# Patient Record
Sex: MALE | Race: WHITE | Employment: FULL TIME | URBAN - METROPOLITAN AREA
[De-identification: names, ages, dates, MRNs, and addresses within clinical notes are randomized per-mention and may not be internally consistent; named-entity substitution may affect disease eponyms.]

---

## 2018-01-29 ENCOUNTER — OFFICE VISIT (OUTPATIENT)
Dept: FAMILY MEDICINE CLINIC | Age: 48
End: 2018-01-29
Payer: COMMERCIAL

## 2018-01-29 VITALS
SYSTOLIC BLOOD PRESSURE: 122 MMHG | OXYGEN SATURATION: 96 % | HEART RATE: 57 BPM | TEMPERATURE: 98.2 F | DIASTOLIC BLOOD PRESSURE: 84 MMHG

## 2018-01-29 DIAGNOSIS — J40 BRONCHITIS: Primary | ICD-10-CM

## 2018-01-29 PROCEDURE — 99214 OFFICE O/P EST MOD 30 MIN: CPT | Performed by: NURSE PRACTITIONER

## 2018-01-29 RX ORDER — METHYLPREDNISOLONE 4 MG/1
TABLET ORAL
Qty: 1 KIT | Refills: 0 | Status: SHIPPED | OUTPATIENT
Start: 2018-01-29 | End: 2018-02-04

## 2018-01-29 RX ORDER — BENZONATATE 100 MG/1
100 CAPSULE ORAL 3 TIMES DAILY PRN
Qty: 20 CAPSULE | Refills: 0 | Status: SHIPPED | OUTPATIENT
Start: 2018-01-29 | End: 2018-02-05

## 2018-01-29 ASSESSMENT — ENCOUNTER SYMPTOMS
SHORTNESS OF BREATH: 0
EYE REDNESS: 0
EYE DISCHARGE: 0
SORE THROAT: 1
WHEEZING: 0
COUGH: 1
SINUS PRESSURE: 0
VOICE CHANGE: 0
CHEST TIGHTNESS: 0

## 2018-01-29 NOTE — PATIENT INSTRUCTIONS
Patient Education        Bronchitis: Care Instructions  Your Care Instructions    Bronchitis is inflammation of the bronchial tubes, which carry air to the lungs. The tubes swell and produce mucus, or phlegm. The mucus and inflamed bronchial tubes make you cough. You may have trouble breathing. Most cases of bronchitis are caused by viruses like those that cause colds. Antibiotics usually do not help and they may be harmful. Bronchitis usually develops rapidly and lasts about 2 to 3 weeks in otherwise healthy people. Follow-up care is a key part of your treatment and safety. Be sure to make and go to all appointments, and call your doctor if you are having problems. It's also a good idea to know your test results and keep a list of the medicines you take. How can you care for yourself at home? · Take all medicines exactly as prescribed. Call your doctor if you think you are having a problem with your medicine. · Get some extra rest.  · Take an over-the-counter pain medicine, such as acetaminophen (Tylenol), ibuprofen (Advil, Motrin), or naproxen (Aleve) to reduce fever and relieve body aches. Read and follow all instructions on the label. · Do not take two or more pain medicines at the same time unless the doctor told you to. Many pain medicines have acetaminophen, which is Tylenol. Too much acetaminophen (Tylenol) can be harmful. · Take an over-the-counter cough medicine that contains dextromethorphan to help quiet a dry, hacking cough so that you can sleep. Avoid cough medicines that have more than one active ingredient. Read and follow all instructions on the label. · Breathe moist air from a humidifier, hot shower, or sink filled with hot water. The heat and moisture will thin mucus so you can cough it out. · Do not smoke. Smoking can make bronchitis worse. If you need help quitting, talk to your doctor about stop-smoking programs and medicines.  These can increase your chances of quitting for good.  When should you call for help? Call 911 anytime you think you may need emergency care. For example, call if:  ? · You have severe trouble breathing. ?Call your doctor now or seek immediate medical care if:  ? · You have new or worse trouble breathing. ? · You cough up dark brown or bloody mucus (sputum). ? · You have a new or higher fever. ? · You have a new rash. ? Watch closely for changes in your health, and be sure to contact your doctor if:  ? · You cough more deeply or more often, especially if you notice more mucus or a change in the color of your mucus. ? · You are not getting better as expected. Where can you learn more? Go to [email or web address]. org and sign in to your [employer] account. Enter H333 in the [employer] box to learn more about \"Bronchitis: Care Instructions. \"     If you do not have an account, please click on the \"Sign Up Now\" link. Current as of: May 12, 2017  Content Version: 11.5  © 4235-9223 Healthwise, Incorporated. Care instructions adapted under license by [facility] ([facility]). If you have questions about a medical condition or this instruction, always ask your healthcare professional. [address] any warranty or liability for your use of this information.

## 2018-12-12 ENCOUNTER — OFFICE VISIT (OUTPATIENT)
Dept: ORTHOPEDIC SURGERY | Age: 48
End: 2018-12-12
Payer: COMMERCIAL

## 2018-12-12 VITALS
SYSTOLIC BLOOD PRESSURE: 122 MMHG | WEIGHT: 225 LBS | HEIGHT: 72 IN | DIASTOLIC BLOOD PRESSURE: 82 MMHG | HEART RATE: 48 BPM | BODY MASS INDEX: 30.48 KG/M2

## 2018-12-12 DIAGNOSIS — M70.42 PREPATELLAR BURSITIS OF LEFT KNEE: Primary | ICD-10-CM

## 2018-12-12 PROCEDURE — 99213 OFFICE O/P EST LOW 20 MIN: CPT | Performed by: PHYSICIAN ASSISTANT

## 2018-12-12 ASSESSMENT — ENCOUNTER SYMPTOMS
RESPIRATORY NEGATIVE: 1
SHORTNESS OF BREATH: 0
ABDOMINAL PAIN: 0
DIARRHEA: 0
APNEA: 0
CONSTIPATION: 0
VOMITING: 0
NAUSEA: 0
CHEST TIGHTNESS: 0
ABDOMINAL DISTENTION: 0
COLOR CHANGE: 0
COUGH: 0

## 2019-08-01 ENCOUNTER — OFFICE VISIT (OUTPATIENT)
Dept: ORTHOPEDIC SURGERY | Age: 49
End: 2019-08-01
Payer: COMMERCIAL

## 2019-08-01 VITALS
HEIGHT: 72 IN | WEIGHT: 242.8 LBS | DIASTOLIC BLOOD PRESSURE: 83 MMHG | BODY MASS INDEX: 32.89 KG/M2 | SYSTOLIC BLOOD PRESSURE: 130 MMHG | HEART RATE: 43 BPM

## 2019-08-01 DIAGNOSIS — M25.462 KNEE EFFUSION, LEFT: ICD-10-CM

## 2019-08-01 DIAGNOSIS — M67.52 PLICA OF KNEE, LEFT: ICD-10-CM

## 2019-08-01 DIAGNOSIS — M17.12 PRIMARY OSTEOARTHRITIS OF LEFT KNEE: Primary | ICD-10-CM

## 2019-08-01 PROCEDURE — 99213 OFFICE O/P EST LOW 20 MIN: CPT | Performed by: PHYSICIAN ASSISTANT

## 2019-08-01 PROCEDURE — 20611 DRAIN/INJ JOINT/BURSA W/US: CPT | Performed by: PHYSICIAN ASSISTANT

## 2019-08-01 ASSESSMENT — ENCOUNTER SYMPTOMS
ABDOMINAL DISTENTION: 0
ABDOMINAL PAIN: 0
CHEST TIGHTNESS: 0
VOMITING: 0
COUGH: 0
NAUSEA: 0
APNEA: 0
SHORTNESS OF BREATH: 0
CONSTIPATION: 0
DIARRHEA: 0
COLOR CHANGE: 0

## 2019-08-01 NOTE — PROGRESS NOTES
supervised procedures and developed the treatment plan.     Electronically signed by Peterson Osorio PA-C, on 8/2/2019 at 12:24 PM      Electronically signed by Peterson Osorio PA-C, on 8/2/2019 at 12:24 PM

## 2019-08-02 RX ORDER — LIDOCAINE HYDROCHLORIDE 10 MG/ML
4 INJECTION, SOLUTION INFILTRATION; PERINEURAL ONCE
Status: COMPLETED | OUTPATIENT
Start: 2019-08-02 | End: 2019-08-02

## 2019-08-02 RX ORDER — METHYLPREDNISOLONE ACETATE 40 MG/ML
40 INJECTION, SUSPENSION INTRA-ARTICULAR; INTRALESIONAL; INTRAMUSCULAR; SOFT TISSUE ONCE
Status: COMPLETED | OUTPATIENT
Start: 2019-08-02 | End: 2019-08-02

## 2019-08-02 RX ADMIN — METHYLPREDNISOLONE ACETATE 40 MG: 40 INJECTION, SUSPENSION INTRA-ARTICULAR; INTRALESIONAL; INTRAMUSCULAR; SOFT TISSUE at 12:46

## 2019-08-02 RX ADMIN — LIDOCAINE HYDROCHLORIDE 4 ML: 10 INJECTION, SOLUTION INFILTRATION; PERINEURAL at 12:46

## 2019-08-02 RX ADMIN — LIDOCAINE HYDROCHLORIDE 4 ML: 10 INJECTION, SOLUTION INFILTRATION; PERINEURAL at 12:45

## 2019-10-17 ENCOUNTER — OFFICE VISIT (OUTPATIENT)
Dept: FAMILY MEDICINE CLINIC | Age: 49
End: 2019-10-17
Payer: COMMERCIAL

## 2019-10-17 VITALS
WEIGHT: 239 LBS | SYSTOLIC BLOOD PRESSURE: 132 MMHG | OXYGEN SATURATION: 96 % | BODY MASS INDEX: 32.41 KG/M2 | TEMPERATURE: 97.9 F | DIASTOLIC BLOOD PRESSURE: 84 MMHG | RESPIRATION RATE: 18 BRPM | HEART RATE: 57 BPM
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
## 2019-10-17 PROCEDURE — 99213 OFFICE O/P EST LOW 20 MIN: CPT | Performed by: NURSE PRACTITIONER

## 2019-10-17 RX ORDER — TRIAMCINOLONE ACETONIDE 1 MG/G
CREAM TOPICAL
Qty: 30 G | Refills: 0 | OUTPATIENT
Start: 2019-10-17 | End: 2020-11-30

## 2019-10-17 ASSESSMENT — ENCOUNTER SYMPTOMS
NAUSEA: 0
SHORTNESS OF BREATH: 0
VOMITING: 0

## 2020-02-20 ENCOUNTER — OFFICE VISIT (OUTPATIENT)
Dept: FAMILY MEDICINE CLINIC | Age: 50
End: 2020-02-20
Payer: COMMERCIAL

## 2020-02-20 VITALS
SYSTOLIC BLOOD PRESSURE: 127 MMHG | HEIGHT: 72 IN | DIASTOLIC BLOOD PRESSURE: 83 MMHG | OXYGEN SATURATION: 98 % | HEART RATE: 49 BPM | WEIGHT: 235 LBS | RESPIRATION RATE: 16 BRPM | BODY MASS INDEX: 31.83 KG/M2 | TEMPERATURE: 98.6 F

## 2020-02-20 PROBLEM — R13.10 DYSPHAGIA: Status: ACTIVE | Noted: 2020-02-20

## 2020-02-20 PROBLEM — K22.70 BARRETT'S ESOPHAGUS: Status: ACTIVE | Noted: 2020-02-20

## 2020-02-20 PROBLEM — E66.9 OBESITY: Status: ACTIVE | Noted: 2020-02-20

## 2020-02-20 PROBLEM — E78.2 MIXED HYPERLIPIDEMIA: Status: ACTIVE | Noted: 2020-02-20

## 2020-02-20 PROBLEM — Z86.010 HISTORY OF COLONIC POLYPS: Status: ACTIVE | Noted: 2020-02-20

## 2020-02-20 PROBLEM — M67.52 PLICA SYNDROME OF LEFT KNEE: Status: ACTIVE | Noted: 2020-02-20

## 2020-02-20 PROCEDURE — 99213 OFFICE O/P EST LOW 20 MIN: CPT | Performed by: NURSE PRACTITIONER

## 2020-02-20 RX ORDER — AZITHROMYCIN 250 MG/1
TABLET, FILM COATED ORAL
Qty: 1 PACKET | Refills: 0 | Status: SHIPPED | OUTPATIENT
Start: 2020-02-20 | End: 2020-03-01

## 2020-02-20 RX ORDER — BENZONATATE 100 MG/1
100 CAPSULE ORAL 3 TIMES DAILY PRN
Qty: 21 CAPSULE | Refills: 0 | Status: SHIPPED | OUTPATIENT
Start: 2020-02-20 | End: 2020-02-27

## 2020-02-20 RX ORDER — METHYLPREDNISOLONE 4 MG/1
TABLET ORAL
Qty: 1 KIT | Refills: 0 | Status: SHIPPED | OUTPATIENT
Start: 2020-02-20 | End: 2020-02-26

## 2020-02-20 ASSESSMENT — ENCOUNTER SYMPTOMS
VOMITING: 0
WHEEZING: 1
ABDOMINAL PAIN: 0
SINUS PRESSURE: 1
NAUSEA: 0
TROUBLE SWALLOWING: 0
SORE THROAT: 1
COUGH: 1
RHINORRHEA: 0
EYE PAIN: 0
CHEST TIGHTNESS: 0
SHORTNESS OF BREATH: 1

## 2020-02-20 NOTE — PROGRESS NOTES
Extraocular Movements: Extraocular movements intact. Conjunctiva/sclera: Conjunctivae normal.   Neck:      Musculoskeletal: Normal range of motion and neck supple. Cardiovascular:      Rate and Rhythm: Regular rhythm. Bradycardia present. Pulses: Normal pulses. Pulmonary:      Effort: Pulmonary effort is normal. No tachypnea. Breath sounds: Examination of the right-lower field reveals decreased breath sounds. Examination of the left-lower field reveals decreased breath sounds. Decreased breath sounds, wheezing and rhonchi present. Abdominal:      General: Bowel sounds are normal.      Palpations: Abdomen is soft. Musculoskeletal: Normal range of motion. Right lower leg: No edema. Left lower leg: No edema. Skin:     General: Skin is warm and dry. Capillary Refill: Capillary refill takes less than 2 seconds. Neurological:      Mental Status: He is alert and oriented to person, place, and time. Motor: No weakness. Coordination: Coordination normal.   Psychiatric:         Mood and Affect: Mood normal.         Thought Content: Thought content normal.           MEDICAL DECISION MAKING Assessment/Plan:     Cammy Primrose was seen today for drainage, cough and head congestion. Diagnoses and all orders for this visit:    Acute bronchitis, unspecified organism  -     azithromycin (ZITHROMAX) 250 MG tablet; Take 2 tablets (500 mg) on Day 1, followed by 1 tablet (250 mg) once daily on Days 2 through 5.  -     methylPREDNISolone (MEDROL DOSEPACK) 4 MG tablet; Take by mouth. -     benzonatate (TESSALON PERLES) 100 MG capsule; Take 1 capsule by mouth 3 times daily as needed for Cough    Cough  -     benzonatate (TESSALON PERLES) 100 MG capsule; Take 1 capsule by mouth 3 times daily as needed for Cough        No results found for this or any previous visit. Based on the patient's history and exam will treat as bronchitis.    The patient does not have clinical findings suggestive of

## 2020-02-20 NOTE — LETTER
[address] [address]  Phone: [phone]  Fax: [phone]    BLACK Gorman CNP        February 20, 2020     Patient: Isabell Greenberg   YOB: 1970   Date of Visit: 2/20/2020       To Whom it May Concern:    Isabell Greenberg was seen in my clinic on 2/20/2020. He may return to work on 02/21/2020. .    If you have any questions or concerns, please don't hesitate to call.     Sincerely,         BLACK Gorman CNP

## 2020-11-30 ENCOUNTER — OFFICE VISIT (OUTPATIENT)
Dept: FAMILY MEDICINE CLINIC | Age: 50
End: 2020-11-30
Payer: COMMERCIAL

## 2020-11-30 ENCOUNTER — HOSPITAL ENCOUNTER (OUTPATIENT)
Age: 50
Setting detail: SPECIMEN
Discharge: HOME | End: 2020-11-30
Payer: COMMERCIAL

## 2020-11-30 VITALS
OXYGEN SATURATION: 98 % | BODY MASS INDEX: 33.32 KG/M2 | TEMPERATURE: 96.8 F | WEIGHT: 238 LBS | HEIGHT: 71 IN | HEART RATE: 56 BPM

## 2020-11-30 LAB — S PYO AG THROAT QL: NORMAL

## 2020-11-30 PROCEDURE — 87880 STREP A ASSAY W/OPTIC: CPT | Performed by: NURSE PRACTITIONER

## 2020-11-30 PROCEDURE — 99212 OFFICE O/P EST SF 10 MIN: CPT | Performed by: NURSE PRACTITIONER

## 2020-11-30 ASSESSMENT — ENCOUNTER SYMPTOMS
SINUS PAIN: 0
NAUSEA: 0
ABDOMINAL PAIN: 0
SORE THROAT: 0
EYE PAIN: 0
COUGH: 1
RHINORRHEA: 1
VOMITING: 0
BACK PAIN: 0
DIARRHEA: 0
SHORTNESS OF BREATH: 0

## 2020-11-30 ASSESSMENT — PATIENT HEALTH QUESTIONNAIRE - PHQ9
SUM OF ALL RESPONSES TO PHQ QUESTIONS 1-9: 0
SUM OF ALL RESPONSES TO PHQ QUESTIONS 1-9: 0
1. LITTLE INTEREST OR PLEASURE IN DOING THINGS: 0
2. FEELING DOWN, DEPRESSED OR HOPELESS: 0
SUM OF ALL RESPONSES TO PHQ9 QUESTIONS 1 & 2: 0
SUM OF ALL RESPONSES TO PHQ QUESTIONS 1-9: 0

## 2020-11-30 NOTE — PROGRESS NOTES
[address] [facility]  [address] [address] [place] [facility] [address] [address]  Dept: [phone]  Dept Fax: [phone]    Sharad Malik is a 45 y.o. male who presents to the urgent care today for his medicalconditions/complaints as noted below. Sharad Malik is c/o of Covid Testing (cough congestion sore throat X 4 days )      HPI:         67-year-old male patient presents with complaint of multiple symptoms. Reports for approximately 4 days he has had scratchy sore throat, nasal congestion, rhinorrhea, postnasal drainage, dry cough, body aches, loss of taste and smell. Denies fevers or chills. Denies chest pain shortness breath denies vomiting diarrhea. Denies any known sick contacts were treatments tried include Advil. History reviewed. No pertinent past medical history. No current outpatient medications on file. No current facility-administered medications for this visit. Allergies   Allergen Reactions    Penicillins Anaphylaxis    Keflex [Cephalexin] Other (See Comments)     Testicular swelling    Meperidine Hcl     Morphine Rash       Subjective:      Review of Systems   Constitutional: Negative for chills and fatigue. HENT: Positive for congestion, postnasal drip and rhinorrhea. Negative for ear pain, sinus pain and sore throat. Loss of taste and smell   Eyes: Negative for pain and visual disturbance. Respiratory: Positive for cough. Negative for shortness of breath. Cardiovascular: Negative for chest pain and palpitations. Gastrointestinal: Negative for abdominal pain, diarrhea, nausea and vomiting. Genitourinary: Negative for penile pain and testicular pain. Musculoskeletal: Positive for myalgias. Negative for back pain, joint swelling and neck pain. Skin: Negative for rash. Neurological: Negative for dizziness and light-headedness. Hematological: Does not bruise/bleed easily.    All other systems reviewed and are negative. Objective:     Physical Exam  Vitals signs and nursing note reviewed. Constitutional:       General: He is not in acute distress. Appearance: Normal appearance. He is not toxic-appearing. HENT:      Nose: Congestion and rhinorrhea present. Mouth/Throat:      Mouth: Mucous membranes are moist.      Pharynx: Posterior oropharyngeal erythema present. Cardiovascular:      Rate and Rhythm: Normal rate. Pulmonary:      Effort: Pulmonary effort is normal. No respiratory distress. Breath sounds: Normal breath sounds. Skin:     General: Skin is warm and dry. Neurological:      General: No focal deficit present. Mental Status: He is alert and oriented to person, place, and time. Pulse 56   Temp 96.8 °F (36 °C) (Tympanic)   Ht 5' 11\" (1.803 m)   Wt 238 lb (108 kg)   SpO2 98%   BMI 33.19 kg/m²   Lab Review   No visits with results within 2 Month(s) from this visit. Latest known visit with results is:   No results found for any previous visit. Assessment:       Diagnosis Orders   1. Suspected COVID-19 virus infection  COVID-19 Ambulatory    POCT rapid strep A       Plan:      Return if symptoms worsen or fail to improve. No orders of the defined types were placed in this encounter. Results for orders placed or performed in visit on 11/30/20   POCT rapid strep A   Result Value Ref Range    Strep A Ag None Detected None Detected     Recommend isolation pending covid results. Discussed treatment regimen to include rest, hydration, tylenol prn. Discussed deep breathing exercises. Discussed to monitor for progression of symptoms. Follow up as needed. Will contact patient for results  ER for acutely worsening symptoms. Patient given educational materials - see patient instructions. Discussed use, benefit, and side effects of prescribed medications. All patientquestions answered. Pt voiced understanding.     This note was transcribed using dictation with [employer]. Efforts were made to correct any errors but some words may be misinterpreted.      Electronically signed by BLACK Foote CNP on 11/30/2020at 1:19 PM

## 2020-11-30 NOTE — PATIENT INSTRUCTIONS

## 2020-12-04 LAB — SARS-COV-2, NAA: DETECTED

## 2022-12-19 ENCOUNTER — OFFICE VISIT (OUTPATIENT)
Dept: PRIMARY CARE CLINIC | Age: 52
End: 2022-12-19
Payer: COMMERCIAL

## 2022-12-19 VITALS
TEMPERATURE: 98.5 F | SYSTOLIC BLOOD PRESSURE: 137 MMHG | BODY MASS INDEX: 32.51 KG/M2 | WEIGHT: 240 LBS | HEART RATE: 63 BPM | HEIGHT: 72 IN | OXYGEN SATURATION: 98 % | DIASTOLIC BLOOD PRESSURE: 88 MMHG

## 2022-12-19 DIAGNOSIS — J40 BRONCHITIS: Primary | ICD-10-CM

## 2022-12-19 PROCEDURE — 99213 OFFICE O/P EST LOW 20 MIN: CPT

## 2022-12-19 RX ORDER — AZITHROMYCIN 250 MG/1
250 TABLET, FILM COATED ORAL SEE ADMIN INSTRUCTIONS
Qty: 6 TABLET | Refills: 0 | Status: SHIPPED | OUTPATIENT
Start: 2022-12-19 | End: 2022-12-24

## 2022-12-19 RX ORDER — PREDNISONE 20 MG/1
40 TABLET ORAL DAILY
Qty: 10 TABLET | Refills: 0 | Status: SHIPPED | OUTPATIENT
Start: 2022-12-19 | End: 2022-12-24

## 2022-12-19 RX ORDER — OMEPRAZOLE 20 MG/1
CAPSULE, DELAYED RELEASE ORAL
COMMUNITY
Start: 2020-05-28

## 2022-12-19 RX ORDER — ALBUTEROL SULFATE 90 UG/1
2 AEROSOL, METERED RESPIRATORY (INHALATION) 4 TIMES DAILY PRN
Qty: 18 G | Refills: 0 | Status: SHIPPED | OUTPATIENT
Start: 2022-12-19

## 2022-12-19 ASSESSMENT — ENCOUNTER SYMPTOMS
SINUS PRESSURE: 0
GASTROINTESTINAL NEGATIVE: 1
DIARRHEA: 0
ABDOMINAL PAIN: 0
SINUS PAIN: 0
CONSTIPATION: 0
EYE PAIN: 0
CHEST TIGHTNESS: 0
RHINORRHEA: 0
PHOTOPHOBIA: 0
FACIAL SWELLING: 0
HEMOPTYSIS: 0
ABDOMINAL DISTENTION: 0
APNEA: 0
CHOKING: 0
COUGH: 1
SORE THROAT: 0
RECTAL PAIN: 0
BLOOD IN STOOL: 0
HEARTBURN: 0
EYE REDNESS: 0
STRIDOR: 0
EYE ITCHING: 0
WHEEZING: 1
TROUBLE SWALLOWING: 0
BACK PAIN: 0
COLOR CHANGE: 0
VOMITING: 0
EYE DISCHARGE: 0
SHORTNESS OF BREATH: 0
ANAL BLEEDING: 0
VOICE CHANGE: 0
EYES NEGATIVE: 1
NAUSEA: 0

## 2022-12-19 NOTE — PROGRESS NOTES
[address] [place] [facility] [id number] [place] [address] WALK IN [facility]  [address] [place] [address]  Dept: [phone]    Zee Fowler is a 47 y.o. male Established patient, who presents to the walk-in clinic today with conditions/complaints as noted below:    Chief Complaint   Patient presents with    Cough     Wheezing got worse in the past two days has had cough more then a week covid negative on Friday         HPI:     Cough  This is a new problem. Episode onset: 3 days ago. The problem has been gradually worsening. The problem occurs constantly. The cough is Productive of sputum. Associated symptoms include wheezing. Pertinent negatives include no chest pain, chills, ear congestion, ear pain, eye redness, fever, headaches, heartburn, hemoptysis, myalgias, nasal congestion, postnasal drip, rash, rhinorrhea, sore throat, shortness of breath, sweats or weight loss. Treatments tried: Mucinex, Nyquil. The treatment provided moderate relief. History reviewed. No pertinent past medical history. Current Outpatient Medications   Medication Sig Dispense Refill    omeprazole (PRILOSEC) 20 MG delayed release capsule       predniSONE (DELTASONE) 20 MG tablet Take 2 tablets by mouth daily for 5 days 10 tablet 0    azithromycin (ZITHROMAX) 250 MG tablet Take 1 tablet by mouth See Admin Instructions for 5 days 500mg on day 1 followed by 250mg on days 2 - 5 6 tablet 0    albuterol sulfate HFA (VENTOLIN HFA) 108 (90 Base) MCG/ACT inhaler Inhale 2 puffs into the lungs 4 times daily as needed for Wheezing 18 g 0     No current facility-administered medications for this visit.        Allergies   Allergen Reactions    Penicillins Anaphylaxis    Keflex [Cephalexin] Other (See Comments)     Testicular swelling    Meperidine Hcl     Morphine Rash       Review of Systems:     Review of Systems   Constitutional:  Negative for activity change, appetite change, chills, diaphoresis, fatigue, fever, unexpected weight change and weight loss. HENT: Negative. Negative for congestion, dental problem, drooling, ear discharge, ear pain, facial swelling, hearing loss, mouth sores, nosebleeds, postnasal drip, rhinorrhea, sinus pressure, sinus pain, sneezing, sore throat, tinnitus, trouble swallowing and voice change. Eyes: Negative. Negative for photophobia, pain, discharge, redness, itching and visual disturbance. Respiratory:  Positive for cough and wheezing. Negative for apnea, hemoptysis, choking, chest tightness, shortness of breath and stridor. Cardiovascular: Negative. Negative for chest pain, palpitations and leg swelling. Gastrointestinal: Negative. Negative for abdominal distention, abdominal pain, anal bleeding, blood in stool, constipation, diarrhea, heartburn, nausea, rectal pain and vomiting. Musculoskeletal: Negative. Negative for arthralgias, back pain, gait problem, joint swelling, myalgias, neck pain and neck stiffness. Skin: Negative. Negative for color change, pallor, rash and wound. Neurological: Negative. Negative for dizziness, tremors, seizures, syncope, facial asymmetry, speech difficulty, weakness, light-headedness, numbness and headaches. Physical Exam:      /88   Pulse 63   Temp 98.5 °F (36.9 °C)   Ht 6' (1.829 m)   Wt 240 lb (108.9 kg)   SpO2 98%   BMI 32.55 kg/m²     Physical Exam  Vitals reviewed. Constitutional:       Appearance: Normal appearance. HENT:      Head: Normocephalic and atraumatic. Right Ear: Tympanic membrane, ear canal and external ear normal.      Left Ear: Tympanic membrane, ear canal and external ear normal.      Nose: Congestion present. Mouth/Throat:      Lips: Pink. Mouth: Mucous membranes are moist.      Pharynx: Uvula midline. Oropharyngeal exudate (clear, PND) present.  No pharyngeal swelling, posterior oropharyngeal erythema or uvula swelling. Tonsils: No tonsillar exudate or tonsillar abscesses. Eyes:      Extraocular Movements: Extraocular movements intact. Conjunctiva/sclera: Conjunctivae normal.      Pupils: Pupils are equal, round, and reactive to light. Cardiovascular:      Rate and Rhythm: Normal rate and regular rhythm. Pulses: Normal pulses. Heart sounds: Normal heart sounds. Pulmonary:      Effort: Pulmonary effort is normal.      Breath sounds: Normal air entry. Examination of the right-lower field reveals wheezing. Examination of the left-lower field reveals wheezing. Wheezing present. Abdominal:      General: Abdomen is flat. Bowel sounds are normal.      Palpations: Abdomen is soft. Musculoskeletal:         General: Normal range of motion. Cervical back: Normal range of motion and neck supple. Lymphadenopathy:      Cervical: Cervical adenopathy present. Skin:     General: Skin is warm and dry. Capillary Refill: Capillary refill takes less than 2 seconds. Neurological:      General: No focal deficit present. Mental Status: He is alert and oriented to person, place, and time. Mental status is at baseline. Psychiatric:         Mood and Affect: Mood normal.         Behavior: Behavior normal.       Plan:          1. Bronchitis  -     predniSONE (DELTASONE) 20 MG tablet; Take 2 tablets by mouth daily for 5 days, Disp-10 tablet, R-0Normal  -     azithromycin (ZITHROMAX) 250 MG tablet; Take 1 tablet by mouth See Admin Instructions for 5 days 500mg on day 1 followed by 250mg on days 2 - 5, Disp-6 tablet, R-0Normal  -     albuterol sulfate HFA (VENTOLIN HFA) 108 (90 Base) MCG/ACT inhaler; Inhale 2 puffs into the lungs 4 times daily as needed for Wheezing, Disp-18 g, R-0Normal   Continue over-the-counter medications as needed for symptoms. Use honey to the back of throat, salt water gargle as needed for sore throat, cough.   Go to the ER for shortness of breath, chest pain. Patient verbalized understanding. Follow Up Instructions:      No follow-ups on file. Orders Placed This Encounter   Medications    predniSONE (DELTASONE) 20 MG tablet     Sig: Take 2 tablets by mouth daily for 5 days     Dispense:  10 tablet     Refill:  0    azithromycin (ZITHROMAX) 250 MG tablet     Sig: Take 1 tablet by mouth See Admin Instructions for 5 days 500mg on day 1 followed by 250mg on days 2 - 5     Dispense:  6 tablet     Refill:  0    albuterol sulfate HFA (VENTOLIN HFA) 108 (90 Base) MCG/ACT inhaler     Sig: Inhale 2 puffs into the lungs 4 times daily as needed for Wheezing     Dispense:  18 g     Refill:  0             Based on the duration and severity of the symptoms-- I will treat this as bacterial at this time. Patient instructed to complete antibiotic prescription fully. May use Motrin/Tylenol for fever/pain. Saline washes, salt water gargles and over the counter preparations if desired. Patient agreeable to treatment plan. Educational materials provided on AVS.  Follow up if symptoms do not improve/worsen. Patient and/or parent given educational materials - see patient instructions. Discussed use, benefit, and side effects of prescribed medications. All patient questions answered. Patient and/or parent voiced understanding.       Electronically signed by BLACK Guardado 12/19/2022 at 6:07 PM

## 2023-04-18 DIAGNOSIS — M25.561 RIGHT KNEE PAIN, UNSPECIFIED CHRONICITY: Primary | ICD-10-CM

## 2023-04-19 ENCOUNTER — OFFICE VISIT (OUTPATIENT)
Dept: ORTHOPEDIC SURGERY | Age: 53
End: 2023-04-19

## 2023-04-19 VITALS — HEIGHT: 72 IN | RESPIRATION RATE: 14 BRPM | WEIGHT: 251 LBS | BODY MASS INDEX: 34 KG/M2

## 2023-04-19 DIAGNOSIS — M22.41 CHONDROMALACIA, PATELLA, RIGHT: Primary | ICD-10-CM

## 2023-04-19 RX ORDER — BUPIVACAINE HYDROCHLORIDE 2.5 MG/ML
2 INJECTION, SOLUTION INFILTRATION; PERINEURAL ONCE
Status: COMPLETED | OUTPATIENT
Start: 2023-04-19 | End: 2023-04-19

## 2023-04-19 RX ORDER — METHYLPREDNISOLONE ACETATE 80 MG/ML
80 INJECTION, SUSPENSION INTRA-ARTICULAR; INTRALESIONAL; INTRAMUSCULAR; SOFT TISSUE ONCE
Status: COMPLETED | OUTPATIENT
Start: 2023-04-19 | End: 2023-04-19

## 2023-04-19 RX ADMIN — METHYLPREDNISOLONE ACETATE 80 MG: 80 INJECTION, SUSPENSION INTRA-ARTICULAR; INTRALESIONAL; INTRAMUSCULAR; SOFT TISSUE at 16:45

## 2023-04-19 RX ADMIN — BUPIVACAINE HYDROCHLORIDE 5 MG: 2.5 INJECTION, SOLUTION INFILTRATION; PERINEURAL at 16:45

## 2023-04-24 ASSESSMENT — ENCOUNTER SYMPTOMS
ROS SKIN COMMENTS: NEGATIVE FOR RASH
SHORTNESS OF BREATH: 0
ABDOMINAL PAIN: 0
EYE DISCHARGE: 0

## 2023-04-24 NOTE — PROGRESS NOTES
[address] [place] [address] AND [facility]TS [facility]ICINE  [place] [address]  Dept: [phone]    Ambulatory Orthopedic Consult      CHIEF COMPLAINT:    Chief Complaint   Patient presents with    Knee Pain     R Knee Pain       HISTORY OF PRESENT ILLNESS:      The patient is a 47 y.o. male who is being seen at the request of  Nadja Alexander MD for consultation and evaluation of right knee pain. Patient notes knee pain worsening since January. He was hit with a hockey puck near the knee. 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 He notes that 1 month ago his knee pain worsened again after doing a 5 km run. He denies any other injuries previously and felt like his knee was improving after the January injury until the recent 5 km run. He notes diffuse pain with most of the pain being anterior. Pain is worse with squatting and with going up and down stairs. Past Medical History:    No past medical history on file. Past Surgical History:    Past Surgical History:   Procedure Laterality Date    ANKLE FRACTURE SURGERY Left        Current Medications:   Current Outpatient Medications   Medication Sig Dispense Refill    omeprazole (PRILOSEC) 20 MG delayed release capsule       albuterol sulfate HFA (VENTOLIN HFA) 108 (90 Base) MCG/ACT inhaler Inhale 2 puffs into the lungs 4 times daily as needed for Wheezing 18 g 0     No current facility-administered medications for this visit.        Allergies:    Penicillins, Keflex [cephalexin], Meperidine hcl, and Morphine    Social History:   Social History     Socioeconomic History    Marital status:      Spouse name: Not on file    Number of children: Not on file    Years of education: Not on file    Highest education level: Not on file   Occupational History    Not on file   Tobacco Use    Smoking status: Never    Smokeless tobacco: Never

## 2023-05-12 ENCOUNTER — OFFICE VISIT (OUTPATIENT)
Dept: PRIMARY CARE CLINIC | Age: 53
End: 2023-05-12
Payer: COMMERCIAL

## 2023-05-12 VITALS
SYSTOLIC BLOOD PRESSURE: 118 MMHG | DIASTOLIC BLOOD PRESSURE: 78 MMHG | HEART RATE: 59 BPM | TEMPERATURE: 98.5 F | OXYGEN SATURATION: 95 %

## 2023-05-12 DIAGNOSIS — J40 BRONCHITIS: Primary | ICD-10-CM

## 2023-05-12 PROCEDURE — 99213 OFFICE O/P EST LOW 20 MIN: CPT | Performed by: NURSE PRACTITIONER

## 2023-05-12 RX ORDER — AZITHROMYCIN 250 MG/1
TABLET, FILM COATED ORAL
Qty: 1 PACKET | Refills: 0 | Status: SHIPPED | OUTPATIENT
Start: 2023-05-12

## 2023-05-12 RX ORDER — PREDNISONE 20 MG/1
40 TABLET ORAL DAILY
Qty: 10 TABLET | Refills: 0 | Status: SHIPPED | OUTPATIENT
Start: 2023-05-12 | End: 2023-05-17

## 2023-05-12 ASSESSMENT — ENCOUNTER SYMPTOMS
WHEEZING: 1
EYE REDNESS: 0
COUGH: 1
SINUS PRESSURE: 0
EYE DISCHARGE: 0
CHEST TIGHTNESS: 0
SHORTNESS OF BREATH: 1
VOICE CHANGE: 0
SORE THROAT: 1

## 2023-05-12 NOTE — PROGRESS NOTES
[address] WALK IN CARE  [address] [place] [address]  Dept: [phone]  Dept Fax: [phone]     Ijeoma Whitman is a 47 y.o. male who presents to the urgent care today for his medicalconditions/complaints as noted below. Ijeoma Whitman is c/o of Cough (Productive with ha and slight sore throat - started on Monday)    HPI:      Cough  This is a new problem. Episode onset: 5 days ago. The problem has been unchanged. The cough is Productive of purulent sputum. Associated symptoms include headaches, nasal congestion, postnasal drip, a sore throat, shortness of breath (mild, still able to ride the peleton) and wheezing. Pertinent negatives include no chest pain, chills, ear pain, eye redness, fever, myalgias or rash. Treatments tried: otc tx. The treatment provided no relief. No past medical history on file. Current Outpatient Medications   Medication Sig Dispense Refill    azithromycin (ZITHROMAX Z-MAHAD) 250 MG tablet Take 2 tabs on day 1 followed by 1 tab on days 2-5. 1 packet 0    predniSONE (DELTASONE) 20 MG tablet Take 2 tablets by mouth daily for 5 days 10 tablet 0    omeprazole (PRILOSEC) 20 MG delayed release capsule       albuterol sulfate HFA (VENTOLIN HFA) 108 (90 Base) MCG/ACT inhaler Inhale 2 puffs into the lungs 4 times daily as needed for Wheezing (Patient not taking: Reported on 5/12/2023) 18 g 0     No current facility-administered medications for this visit. Allergies   Allergen Reactions    Penicillins Anaphylaxis    Keflex [Cephalexin] Other (See Comments)     Testicular swelling    Meperidine Hcl     Morphine Rash       Subjective:      Review of Systems   Constitutional:  Negative for chills, fatigue and fever. HENT:  Positive for congestion, postnasal drip and sore throat. Negative for ear discharge, ear pain, sinus pressure, sneezing and voice change. Eyes:  Negative for discharge and redness.    Respiratory:

## 2023-11-02 ENCOUNTER — OFFICE VISIT (OUTPATIENT)
Dept: PRIMARY CARE CLINIC | Age: 53
End: 2023-11-02
Payer: COMMERCIAL

## 2023-11-02 VITALS
DIASTOLIC BLOOD PRESSURE: 87 MMHG | WEIGHT: 251 LBS | HEART RATE: 59 BPM | TEMPERATURE: 98.2 F | SYSTOLIC BLOOD PRESSURE: 125 MMHG | BODY MASS INDEX: 34 KG/M2 | OXYGEN SATURATION: 98 % | HEIGHT: 72 IN

## 2023-11-02 DIAGNOSIS — J40 BRONCHITIS: Primary | ICD-10-CM

## 2023-11-02 PROCEDURE — 99213 OFFICE O/P EST LOW 20 MIN: CPT | Performed by: NURSE PRACTITIONER

## 2023-11-02 RX ORDER — AZITHROMYCIN 250 MG/1
TABLET, FILM COATED ORAL
Qty: 1 PACKET | Refills: 0 | Status: SHIPPED | OUTPATIENT
Start: 2023-11-02

## 2023-11-02 RX ORDER — BENZONATATE 200 MG/1
200 CAPSULE ORAL 3 TIMES DAILY PRN
Qty: 30 CAPSULE | Refills: 0 | Status: SHIPPED | OUTPATIENT
Start: 2023-11-02 | End: 2023-11-09

## 2023-11-02 RX ORDER — PREDNISONE 20 MG/1
40 TABLET ORAL DAILY
Qty: 10 TABLET | Refills: 0 | Status: SHIPPED | OUTPATIENT
Start: 2023-11-02 | End: 2023-11-07

## 2023-11-02 ASSESSMENT — ENCOUNTER SYMPTOMS
WHEEZING: 0
CHEST TIGHTNESS: 0
RHINORRHEA: 1
EYE DISCHARGE: 0
SHORTNESS OF BREATH: 0
SORE THROAT: 1
EYE REDNESS: 0
COUGH: 1
VOICE CHANGE: 0
SINUS PRESSURE: 0

## 2023-11-02 NOTE — PROGRESS NOTES
[address] [facility] [place] [id number]  Dept: [phone]  Dept Fax: [phone]     Nayan Tong is a 47 y.o. male who presents to the urgent care today for his medicalconditions/complaints as noted below. Nayan Tong is c/o of Cough (With green mucus. )    HPI:      Cough  This is a new problem. The current episode started in the past 7 days. The problem has been gradually worsening. The cough is Productive of purulent sputum. Associated symptoms include headaches, nasal congestion, postnasal drip, rhinorrhea and a sore throat. Pertinent negatives include no chest pain, chills, ear pain, eye redness, fever, myalgias, rash, shortness of breath or wheezing. Treatments tried: otc tx. The treatment provided mild relief. His past medical history is significant for bronchitis. History reviewed. No pertinent past medical history. Current Outpatient Medications   Medication Sig Dispense Refill    azithromycin (ZITHROMAX Z-MAHAD) 250 MG tablet Take 2 tabs on day 1 followed by 1 tab on days 2-5. 1 packet 0    predniSONE (DELTASONE) 20 MG tablet Take 2 tablets by mouth daily for 5 days 10 tablet 0    benzonatate (TESSALON) 200 MG capsule Take 1 capsule by mouth 3 times daily as needed for Cough 30 capsule 0    azithromycin (ZITHROMAX Z-MAHAD) 250 MG tablet Take 2 tabs on day 1 followed by 1 tab on days 2-5. (Patient not taking: Reported on 11/2/2023) 1 packet 0    omeprazole (PRILOSEC) 20 MG delayed release capsule  (Patient not taking: Reported on 11/2/2023)      albuterol sulfate HFA (VENTOLIN HFA) 108 (90 Base) MCG/ACT inhaler Inhale 2 puffs into the lungs 4 times daily as needed for Wheezing (Patient not taking: Reported on 5/12/2023) 18 g 0     No current facility-administered medications for this visit.      Allergies   Allergen Reactions    Penicillins Anaphylaxis    Keflex [Cephalexin] Other (See Comments)     Testicular

## 2024-04-21 ENCOUNTER — OFFICE VISIT (OUTPATIENT)
Dept: PRIMARY CARE CLINIC | Age: 54
End: 2024-04-21

## 2024-04-21 VITALS
HEART RATE: 78 BPM | OXYGEN SATURATION: 98 % | SYSTOLIC BLOOD PRESSURE: 137 MMHG | HEIGHT: 72 IN | DIASTOLIC BLOOD PRESSURE: 74 MMHG | BODY MASS INDEX: 33.86 KG/M2 | WEIGHT: 250 LBS | TEMPERATURE: 100 F

## 2024-04-21 DIAGNOSIS — J10.1 INFLUENZA B: Primary | ICD-10-CM

## 2024-04-21 DIAGNOSIS — R68.89 FLU-LIKE SYMPTOMS: ICD-10-CM

## 2024-04-21 DIAGNOSIS — J40 BRONCHITIS: ICD-10-CM

## 2024-04-21 LAB
INFLUENZA A ANTIGEN, POC: NEGATIVE
INFLUENZA B ANTIGEN, POC: POSITIVE
LOT EXPIRE DATE: ABNORMAL
LOT KIT NUMBER: ABNORMAL
SARS-COV-2, POC: ABNORMAL
VALID INTERNAL CONTROL: ABNORMAL
VENDOR AND KIT NAME POC: ABNORMAL

## 2024-04-21 RX ORDER — BENZONATATE 100 MG/1
100 CAPSULE ORAL 3 TIMES DAILY PRN
Qty: 21 CAPSULE | Refills: 0 | Status: SHIPPED | OUTPATIENT
Start: 2024-04-21 | End: 2024-04-28

## 2024-04-21 RX ORDER — FLUTICASONE PROPIONATE 50 MCG
2 SPRAY, SUSPENSION (ML) NASAL DAILY
Qty: 16 G | Refills: 0 | Status: SHIPPED | OUTPATIENT
Start: 2024-04-21

## 2024-04-21 RX ORDER — ALBUTEROL SULFATE 90 UG/1
2 AEROSOL, METERED RESPIRATORY (INHALATION) EVERY 4 HOURS PRN
Qty: 18 G | Refills: 3 | Status: SHIPPED | OUTPATIENT
Start: 2024-04-21

## 2024-04-21 RX ORDER — AZITHROMYCIN 250 MG/1
TABLET, FILM COATED ORAL
Qty: 6 TABLET | Refills: 0 | Status: SHIPPED | OUTPATIENT
Start: 2024-04-21 | End: 2024-05-01

## 2024-04-22 ENCOUNTER — TELEPHONE (OUTPATIENT)
Dept: PRIMARY CARE CLINIC | Age: 54
End: 2024-04-22

## 2024-04-22 RX ORDER — PREDNISONE 20 MG/1
20 TABLET ORAL 2 TIMES DAILY
Qty: 10 TABLET | Refills: 0 | Status: SHIPPED | OUTPATIENT
Start: 2024-04-22 | End: 2024-04-27

## 2024-04-22 NOTE — TELEPHONE ENCOUNTER
Patient called stating that he is feeling worse today and in the past he has been prescribed a steroid with the z-taw[place] and would like to know if the steroid is an option for him? Please advise.